# Patient Record
Sex: FEMALE | Race: WHITE
[De-identification: names, ages, dates, MRNs, and addresses within clinical notes are randomized per-mention and may not be internally consistent; named-entity substitution may affect disease eponyms.]

---

## 2019-06-12 ENCOUNTER — HOSPITAL ENCOUNTER (INPATIENT)
Dept: HOSPITAL 50 - VM.ED | Age: 74
LOS: 2 days | Discharge: NURSING, REHAB OR LONG-TERM CARE | DRG: 378 | End: 2019-06-14
Attending: FAMILY MEDICINE | Admitting: FAMILY MEDICINE
Payer: MEDICARE

## 2019-06-12 DIAGNOSIS — Z91.018: ICD-10-CM

## 2019-06-12 DIAGNOSIS — K20.9: ICD-10-CM

## 2019-06-12 DIAGNOSIS — M81.0: ICD-10-CM

## 2019-06-12 DIAGNOSIS — R00.0: ICD-10-CM

## 2019-06-12 DIAGNOSIS — Z66: ICD-10-CM

## 2019-06-12 DIAGNOSIS — K22.8: ICD-10-CM

## 2019-06-12 DIAGNOSIS — R05: ICD-10-CM

## 2019-06-12 DIAGNOSIS — G40.909: ICD-10-CM

## 2019-06-12 DIAGNOSIS — F73: ICD-10-CM

## 2019-06-12 DIAGNOSIS — F03.90: ICD-10-CM

## 2019-06-12 DIAGNOSIS — R11.10: ICD-10-CM

## 2019-06-12 DIAGNOSIS — K56.7: ICD-10-CM

## 2019-06-12 DIAGNOSIS — Z79.899: ICD-10-CM

## 2019-06-12 DIAGNOSIS — K44.9: ICD-10-CM

## 2019-06-12 DIAGNOSIS — K59.09: ICD-10-CM

## 2019-06-12 DIAGNOSIS — E87.0: ICD-10-CM

## 2019-06-12 DIAGNOSIS — J45.909: ICD-10-CM

## 2019-06-12 DIAGNOSIS — G80.9: ICD-10-CM

## 2019-06-12 DIAGNOSIS — Z91.041: ICD-10-CM

## 2019-06-12 DIAGNOSIS — K31.84: ICD-10-CM

## 2019-06-12 DIAGNOSIS — K92.0: Primary | ICD-10-CM

## 2019-06-12 PROCEDURE — C9113 INJ PANTOPRAZOLE SODIUM, VIA: HCPCS

## 2019-06-13 LAB
ANION GAP SERPL CALC-SCNC: 16.3 MMOL/L (ref 10–20)
CHLORIDE SERPL-SCNC: 105 MMOL/L (ref 98–107)
SODIUM SERPL-SCNC: 144 MMOL/L (ref 136–145)

## 2019-06-13 RX ADMIN — SODIUM CHLORIDE AND POTASSIUM CHLORIDE SCH MLS/HR: .9; .15 SOLUTION INTRAVENOUS at 07:13

## 2019-06-13 RX ADMIN — BUDESONIDE SCH MG: 0.5 SUSPENSION RESPIRATORY (INHALATION) at 20:08

## 2019-06-13 RX ADMIN — ZINC SCH: TAB ORAL at 12:40

## 2019-06-13 RX ADMIN — MICONAZOLE NITRATE SCH APPLICFUL: 20 CREAM VAGINAL at 11:39

## 2019-06-13 RX ADMIN — SODIUM CHLORIDE AND POTASSIUM CHLORIDE SCH MLS/HR: .9; .15 SOLUTION INTRAVENOUS at 15:02

## 2019-06-13 RX ADMIN — BUDESONIDE SCH MG: 0.5 SUSPENSION RESPIRATORY (INHALATION) at 10:57

## 2019-06-13 RX ADMIN — MICONAZOLE NITRATE SCH APPLICFUL: 20 CREAM VAGINAL at 20:09

## 2019-06-13 NOTE — CR
______________________________________________________________________________   

  

[id number] RAD/RAD Chest PA or AP 1V  

EXAM:  RAD Chest PA or AP 1V  

   

 INDICATION:  WHEEZING, ASPIRATION?  

   

 COMPARISON:  2014.  

   

 DISCUSSION:    

   

 Asymmetric elevated left hemidiaphragm resulting in left lung base vascular  

 crowding and atelectasis.  

   

 Within limitations of severe scoliosis and elevated left hemidiaphragm, no  

 evidence of aspiration.  

   

 There is possible cardiomegaly with mild central vascular congestion.  

   

 Colon demonstrates gaseous distention.  

   

 IMPRESSION:  

 As above.  

   

 Electronically signed by Jose Bassett MD on 6/13/2019 7:53 AM  

   

  

Jose Bassett MD                 

 06/13/19 0755    

  

Thank you for allowing us to participate in the care of your patient.

## 2019-06-13 NOTE — PCM.HP
H&P History of Present Illness





- General


Date of Service: 06/13/19


Admit Problem/Dx: 


 Admission Diagnosis/Problem





Admission Diagnosis/Problem      Ileus








Source of Information: EMS, Nursing Home Records, Provider


History Limitations: Reports: Physical Impairment





- History of Present Illness


Initial Comments - Free Text/Narative: 








Chief complaint: Vomiting





History of present illness: Patient was sent from nursing home to ER for 

possible hematemesis.  She vomits and coughs a lot at baseline.  She has 

profound intellectual disability plus dementia and cerebral palsy.  She been 

throwing up a bit more in the noticed a little bit of blood and this appeared 

bit brown.  No fever or diarrhea or change otherwise in her baseline.  On 

arrival in the ER they did Hemoccult of gastric and rectal contents which was 

negative for blood.  She was not anemic.  Vomiting seem to slow in the ER with 

some treatment there.  She had CT abdomen and chest pelvis which were pretty 

unrevealing for any real acute source.  She had an ileus which appears been 

present on past films.  She has gastroparesis which also looks like it's 

chronic is she is on Reglan four times a day at baseline.  There is diffuse 

thickening of the distal esophagus NOS.





Past medical history: Profound intellectual disability, seizure disorder, 

dementia, osteoporosis, hiatal hernia, past fractures, endometrial carcinoma, 

decubitus ulcer, constipation, cerebral palsy, asthma, 





Medication list unresolved in EPIC: Pulmicort, Atrovent, Proventil, Reglan, 

phenobarbital, Atrovent, Zantac, Maria Fernanda lax, Singulair, and teaser, senna, Asmanex

, Flovent, takes citrate, DuoNeb, Prilosec, Duca lax, milk of magnesia.  

Allergies: Red dye





Social history: Nursing home resident, nonsmoker.





Family history: Noncontributory





Review of systems: Unable to obtain beyond above.





Physical exam: Vital signs are normal, she is laying in bed fetal position on 

her right side.  She is nonverbal.  Eyes are open spontaneously, no acute 

distress, oral mucosa is pink and a bit dry.  There is a bit of brown material 

increases of her tongue.  I don't see any gross oral lacerations.  Heart and 

lungs are clear to auscultation, abdomen mild distended, appears nontender and 

a nonverbal patient.  Chimneys warm well perfused, trace edema.





Assessment and plan:


Report of possible hematemesis.  Hemoglobin is stable, Hemoccult was negative, 

appears to be from some local trauma possibly in the mouth.  Recheck CBC in a.m.

, monitor for any recurrence.  Has a seizure history but no recent seizure 

activity noted as cause of new oral trauma.  Continue acid suppressing medicine 

for some esophagitis NOS seen by CT.





Regarding vomiting it appears improved.  Resume home Reglan.  Appears to have 

vomiting, gastroparesis and maybe chronic ileus at baseline.  Advance diet to 

clear liquids, continue maintenance fluids for now, advance to her baseline 

pure diet as tolerated.  Recheck chemistry in a.m.











- Related Data


Allergies/Adverse Reactions: 


 Allergies











Allergy/AdvReac Type Severity Reaction Status Date / Time


 


red dye Allergy Unknown Other Verified 06/13/19 09:24











Home Medications: 


 Home Meds





Bisacodyl 10 mg RC DAILY PRN 04/18/14 [History]


Metoclopramide [Reglan] 10 mg PO QID 04/18/14 [History]


Montelukast [Singulair] 10 mg PO DAILY 04/18/14 [History]


Multivitamin with Minerals [Multivitamins with Minerals] 1 each PO DAILY 04/18/ 14 [History]


PHENobarbital 97.2 mg PO DAILY 04/18/14 [History]


Sennosides [Senokot] 17.2 mg PO BID 04/18/14 [History]


Acetaminophen [Tylenol] 650 mg PO Q4H PRN 06/13/19 [History]


Albuterol/Ipratropium [DuoNeb 3.0-0.5 MG/3 ML] 3 ml INH Q4HR PRN 06/13/19 [

History]


Albuterol/Ipratropium [DuoNeb 3.0-0.5 MG/3 ML] 3 ml INH QID 06/13/19 [History]


Bisacodyl 5 mg PO DAILY PRN 06/13/19 [History]


Bisacodyl [Laxative Suppository] 10 mg RC Q48H PRN 06/13/19 [History]


Budesonide [Pulmicort] 0.5 mg NEB BID 06/13/19 [History]


Dextran 70/Hypromellose [Artificial Tears] 1 - 2 drop EYELF ASDIRECTED PRN 06/13 /19 [History]


Magnesium Hydroxide [Milk of Magnesia] 15 ml PO Q3D PRN 06/13/19 [History]


Miconazole [Miconazole 2% Crm] 1 applic TOP BID 06/13/19 [History]


Polyethylene Glycol 3350 [MiraLAX] 17 gm PO DAILY 06/13/19 [History]


Ranitidine [Zantac] 150 mg PO BID 06/13/19 [History]











Past Medical History


Respiratory History: Reports: Asthma


Gastrointestinal History: Reports: Chronic Constipation, Other (See Below)


Other Gastrointestinal History: dysphagia, dyspepsia


Musculoskeletal History: Reports: Osteoporosis


Neurological History: Reports: Cerebral Palsy, Seizure, Other (See Below)


Other Neuro History: dementia, profound intellectual disabilities, convulsions





Social & Family History





- Family History


Family Medical History: Noncontributory





- Tobacco Use


Smoking Status *Q: Current Status Unknown


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


Second Hand Smoke Exposure: No





- Caffeine Use


Caffeine Use: Reports: None





- Recreational Drug Use


Recreational Drug Use: No





H&P Review of Systems





- Review of Systems:


Review Of Systems: See Below





Exam





- Exam


Exam: See Below





- Vital Signs


Vital Signs: 


 Last Vital Signs











Temp  37.1 C   06/13/19 05:59


 


Pulse  102 H  06/13/19 05:59


 


Resp  20   06/13/19 05:59


 


BP  139/59 L  06/13/19 05:59


 


Pulse Ox  91 L  06/13/19 07:21











Weight: 49.243 kg





- Patient Data


Lab Results Last 24 hrs: 


 Laboratory Results - last 24 hr











  06/13/19 06/13/19 06/13/19 Range/Units





  00:04 00:04 00:04 


 


WBC  11.5 H    (4.0-10.0)  x10^3/uL


 


RBC  4.81    (4.00-5.50)  x10^6/uL


 


Hgb  14.6  D    (12.0-16.0)  g/dL


 


Hct  44.6    (33.0-47.0)  %


 


MCV  92.7  D    (78.0-93.0)  fL


 


MCH  30.4    (26.0-32.0)  pg


 


MCHC  32.7    (32.0-36.0)  g/dL


 


RDW Coeff of Precious  13.9    (10.0-15.0)  %


 


Plt Count  280    (130-400)  x10^3/uL


 


Neut % (Auto)  91.7 H    (50.0-80.0)  %


 


Lymph % (Auto)  5.1 L    (25.0-50.0)  %


 


Mono % (Auto)  2.7    (2.0-11.0)  %


 


Eos % (Auto)  0.3    (0.0-4.0)  %


 


Baso % (Auto)  0.2    (0.2-1.2)  %


 


Add Manual Diff     


 


PT   10.8   (10.0-12.8)  SEC


 


INR   1.0 L   (2.0-3.5)  


 


Sodium    144  (136-145)  mmol/L


 


Potassium    4.3  (3.5-5.1)  mmol/L


 


Chloride    105  ()  mmol/L


 


Carbon Dioxide    27  (21-32)  mmol/L


 


Anion Gap    16.3  (10-20)  mmol/L


 


BUN    21 H  (7-18)  mg/dL


 


Creatinine    0.6  (0.55-1.02)  mg/dL


 


Est Cr Clr Drug Dosing    TNP  


 


Estimated GFR (MDRD)    > 60  


 


Glucose    145 H  ()  mg/dL


 


Lactic Acid     (0.4-2.0)  mmol/L


 


Calcium    9.6  (8.5-10.1)  mg/dL


 


Corrected Calcium    10.24 H  (8.5-10.1)  mg/dL


 


Phosphorus    3.1  (2.6-4.7)  mg/dL


 


Magnesium    2.1  (1.8-2.4)  mg/dL


 


Total Bilirubin    0.3  (0.2-1.0)  mg/dL


 


AST    32  (15-37)  U/L


 


ALT    78 H  (14-59)  U/L


 


Alkaline Phosphatase    173 H  ()  U/L


 


C-Reactive Protein    1.3 H  (<=0.9)  mg/dL


 


Total Protein    8.1  (6.4-8.2)  g/dL


 


Albumin    3.2 L  (3.4-5.0)  g/dL


 


Globulin    4.9  


 


Albumin/Globulin Ratio    0.65  














  06/13/19 06/13/19 06/13/19 Range/Units





  00:04 06:21 06:21 


 


WBC   10.8 H   (4.0-10.0)  x10^3/uL


 


RBC   4.40   (4.00-5.50)  x10^6/uL


 


Hgb   13.3   (12.0-16.0)  g/dL


 


Hct   41.2   (33.0-47.0)  %


 


MCV   93.6 H   (78.0-93.0)  fL


 


MCH   30.2   (26.0-32.0)  pg


 


MCHC   32.3   (32.0-36.0)  g/dL


 


RDW Coeff of Precious   13.9   (10.0-15.0)  %


 


Plt Count   228   (130-400)  x10^3/uL


 


Neut % (Auto)   89.0 H   (50.0-80.0)  %


 


Lymph % (Auto)   6.0 L   (25.0-50.0)  %


 


Mono % (Auto)   5.0   (2.0-11.0)  %


 


Eos % (Auto)   0.0   (0.0-4.0)  %


 


Baso % (Auto)   0.0 L   (0.2-1.2)  %


 


Add Manual Diff   Yes   


 


PT     (10.0-12.8)  SEC


 


INR     (2.0-3.5)  


 


Sodium     (136-145)  mmol/L


 


Potassium     (3.5-5.1)  mmol/L


 


Chloride     ()  mmol/L


 


Carbon Dioxide     (21-32)  mmol/L


 


Anion Gap     (10-20)  mmol/L


 


BUN     (7-18)  mg/dL


 


Creatinine     (0.55-1.02)  mg/dL


 


Est Cr Clr Drug Dosing     


 


Estimated GFR (MDRD)     


 


Glucose     ()  mg/dL


 


Lactic Acid  1.0   0.7  (0.4-2.0)  mmol/L


 


Calcium     (8.5-10.1)  mg/dL


 


Corrected Calcium     (8.5-10.1)  mg/dL


 


Phosphorus     (2.6-4.7)  mg/dL


 


Magnesium     (1.8-2.4)  mg/dL


 


Total Bilirubin     (0.2-1.0)  mg/dL


 


AST     (15-37)  U/L


 


ALT     (14-59)  U/L


 


Alkaline Phosphatase     ()  U/L


 


C-Reactive Protein     (<=0.9)  mg/dL


 


Total Protein     (6.4-8.2)  g/dL


 


Albumin     (3.4-5.0)  g/dL


 


Globulin     


 


Albumin/Globulin Ratio     











Result Diagrams: 


 06/13/19 06:21





 06/13/19 00:04


Sancho Results Last 24 hrs: 


 Microbiology











 06/13/19 00:11 Anaerobic Blood Culture - Final





 Blood - Venous - Lab Draw 











Problem List Initiated/Reviewed/Updated: Yes


Orders Last 24hrs: 


 Active Orders 24 hr











 Category Date Time Status


 


 Patient Status [ADT] Routine ADT  06/13/19 02:19 Active


 


 Bedrest [RC] 08,20 Care  06/13/19 02:46 Active


 


 Intake and Output [RC] QSHIFT Care  06/13/19 02:46 Active


 


 Vital Signs [RC] 06,10,14,18,22,02 Care  06/13/19 02:46 Active


 


 Clear Liquid Diet [DIET] Diet  06/13/19 Lunch Ordered


 


 Nothing Per Oral Diet [DIET] Diet  06/13/19 Breakfast Active


 


 BASIC METABOLIC PANEL,BMP [CHEM] AM Lab  06/14/19 05:11 Ordered


 


 CBC WITH AUTO DIFF [HEME] AM Lab  06/14/19 05:11 Ordered


 


 CULTURE BLOOD [BC] Stat Lab  06/12/19 23:42 Received


 


 CULTURE BLOOD [BC] Stat Lab  06/12/19 23:42 Results


 


 CULTURE MRSA SURVEY [RM] Routine Lab  06/13/19 04:46 Received


 


 Acetaminophen [Tylenol] Med  06/13/19 10:10 Ordered





 650 mg PO Q4H PRN   


 


 Acetaminophen [Tylenol] Med  06/13/19 02:48 Active





 650 mg PO Q4HR PRN   


 


 Albuterol/Ipratropium [DuoNeb 3.0-0.5 MG/3 ML] Med  06/13/19 02:48 Active





 3 ml INH Q4H PRN   


 


 Albuterol/Ipratropium [DuoNeb 3.0-0.5 MG/3 ML] Med  06/13/19 07:00 Active





 3 ml INH QIDRT   


 


 Bisacodyl [Dulcolax] Med  06/13/19 10:10 Ordered





 10 mg .XX DAILY PRN   


 


 Bisacodyl [Dulcolax] Med  06/13/19 10:10 Ordered





 10 mg .XX Q48H PRN   


 


 Bisacodyl [Dulcolax] Med  06/13/19 10:10 Ordered





 5 mg PO DAILY PRN   


 


 Budesonide [Pulmicort] Med  06/13/19 11:00 Active





 0.5 mg INH BIDRT   


 


 Budesonide [Pulmicort] Med  06/13/19 20:00 Ordered





 0.5 mg NEB BID   


 


 Dextran 70/Hypromellose [Artificial Tears] Med  06/13/19 10:10 Ordered





 1 - 2 drop EYELF ASDIRECTED PRN   


 


 Magnesium Hydroxide [Milk of Magnesia] Med  06/13/19 10:10 Ordered





 15 ml PO Q3D PRN   


 


 Metoclopramide [Reglan] Med  06/13/19 12:00 Ordered





 10 mg PO QID   


 


 Metoclopramide [Reglan] Med  06/13/19 03:00 Active





 5 mg IVPUSH Q6H   


 


 Miconazole [Miconazole 2% Crm] Med  06/13/19 20:00 Ordered





 1 applic TOP BID   


 


 Miconazole [Miconazole 2% Vaginal] Med  06/13/19 09:45 Active





 0 gm TOP BID   


 


 Montelukast [Singulair] Med  06/14/19 08:00 Ordered





 10 mg PO DAILY   


 


 Multivitamin with Minerals [Multivitamins with Minerals Med  06/14/19 08:00 

Ordered





 ]   





 1 each PO DAILY   


 


 NS + KCl 20mEq/L [Normal Saline with 20 mEq KCl] 1,000 Med  06/13/19 03:00 

Active





 ml   





 IV ASDIRECTED   


 


 PHENobarbital Med  06/13/19 08:00 Active





 97.2 mg PO DAILY   


 


 Pantoprazole [ProTONIX IV***] Med  06/13/19 03:00 Active





 40 mg IVPUSH Q12H   


 


 Polyethylene Glycol 3350 [MiraLAX] Med  06/14/19 08:00 Ordered





 17 gm PO DAILY   


 


 Ranitidine [Zantac] Med  06/13/19 20:00 Ordered





 150 mg PO BID   


 


 Sennosides [Senna] Med  06/13/19 20:00 Ordered





 17.2 mg PO BID   


 


 Sodium Chloride 0.9% [Saline Flush] Med  06/12/19 23:34 Active





 10 ml FLUSH ASDIRECTED PRN   


 


 Blood Culture x2 Reflex Set [OM.PC] Stat Oth  06/12/19 23:41 Ordered


 


 Peripheral IV Insertion Adult [OM.PC] Routine Oth  06/12/19 23:35 Ordered








 Medication Orders





Acetaminophen (Tylenol)  650 mg PO Q4HR PRN


   PRN Reason: Pain


Acetaminophen (Tylenol)  650 mg PO Q4H PRN


   PRN Reason: Pain (mild 1-3)


Albuterol/Ipratropium (Duoneb 3.0-0.5 Mg/3 Ml)  3 ml INH Q4H PRN


   PRN Reason: Wheezing


Albuterol/Ipratropium (Duoneb 3.0-0.5 Mg/3 Ml)  3 ml INH QIDRT [facility]


   Last Admin: 06/13/19 07:13  Dose: 3 ml


Bisacodyl (Dulcolax)  10 mg .XX Q48H PRN


   PRN Reason: Constipation


Bisacodyl (Dulcolax)  5 mg PO DAILY PRN


   PRN Reason: Constipation


Bisacodyl (Dulcolax)  10 mg .XX DAILY PRN


   PRN Reason: Constipation


Budesonide (Pulmicort)  0.5 mg INH BIDRT [facility]ATI


Budesonide (Pulmicort)  0.5 mg NEB BID [facility]ATI


Potassium Chloride/Sodium Chloride (Normal Saline With 20 Meq Kcl)  1,000 mls @ 

100 mls/hr IV ASDIRECTED [facility]


   Last Admin: 06/13/19 07:13  Dose: 100 mls/hr


Magnesium Hydroxide (Milk Of Magnesia)  15 ml PO Q3D PRN


   PRN Reason: Constipation


Metoclopramide HCl (Reglan)  5 mg IVPUSH Q6H [facility]


   Last Admin: 06/13/19 09:05  Dose: 5 mg


   Admin: 06/13/19 03:11  Dose: 5 mg


Metoclopramide HCl (Reglan)  10 mg PO QID [facility]


Miconazole (Miconazole 2% Vaginal)  0 gm TOP BID [facility]


Montelukast Sodium (Singulair)  10 mg PO DAILY [facility]


Non-Formulary Medication (Dextran 70/Hypromellose [Artificial Tears])  1 - 2 

drop EYELF ASDIRECTED PRN


   PRN Reason: Dry Eyes


Non-Formulary Medication (Miconazole [Miconazole 2% Crm])  1 applic TOP BID [facility]


Non-Formulary Medication (Multivitamin With Minerals [Multivitamins With 

Minerals])  1 each PO DAILY [facility]


Non-Formulary Medication (Ranitidine [Zantac])  150 mg PO BID [facility]


Pantoprazole Sodium (Protonix Iv***)  40 mg IVPUSH Q12H [facility]


   Last Admin: 06/13/19 03:15  Dose: 40 mg


Phenobarbital (Phenobarbital)  97.2 mg PO DAILY [facility]


   Last Admin: 06/13/19 09:11 Dose:  Not Given


Polyethylene Glycol (Miralax)  17 gm PO DAILY [facility]


Senna (Senna)  17.2 mg PO BID [facility]


Sodium Chloride (Saline Flush)  10 ml FLUSH ASDIRECTED PRN


   PRN Reason: Keep Vein Open


   Last Admin: 06/13/19 03:24  Dose: 10 ml

## 2019-06-13 NOTE — CT
______________________________________________________________________________   

  

[id number] CT/CT Chest Abdomen Pelvis W IV  

EXAM: CT Chest Abdomen Pelvis W IV  

   

 CLINICAL DATA: HEMATEMESIS, DIMINISHED BOWEL SOUNDS  

   

 COMPARISON STUDY: April 2014.  

   

 FINDINGS:   

   

 Chest, abdomen, and pelvis:  

   

 12 x 9 x 5 mm solid noncalcified left lower lobe pulmonary nodule (series 2  

 image 33). Finding is nonspecific in etiology. Bibasal subsegmental atelectasis  

 and/or scarring.  

   

 No mediastinal or hilar lymphadenopathy.  

   

 Moderate circumferential thickening throughout the distal aspect of the  

 esophagus extending to the gastroesophageal junction. Stomach is dilated and  

 distended with air-fluid level.  

   

 Colon demonstrates marked gaseous distention without evidence of obstructing  

 mass. No evidence of a small bowel obstruction. No pneumatosis or  

 pneumoperitoneum.  

   

 Cholelithiasis without evidence of acute cholecystitis. Liver, spleen, pancreas,  

 and adrenal glands are unremarkable.  

   

 4 mm calculus in the proximal left ureter just distal to the UPJ. No evidence of  

 obstruction. Kidneys are otherwise unremarkable.  

   

 Trace free fluid in the dependent recess of the pelvis.  

   

 Bones and soft tissues:  

   

 Advanced dextroscoliosis. 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  

 dislocated left femoroacetabular articulation.  

   

 No acute fracture, compression deformity, or osseous lesion.  

   

 IMPRESSION:  

   

 Moderate circumferential wall thickening in the distal esophagus, nonspecific  

 but suggesting esophagitis.  

   

 Gaseous distention and dilation of the stomach with an air-fluid level.  

   

 Gaseous distention of the colon without obstructing mass identified.  

   

 Trace free fluid in the dependent recess of the pelvis, nonspecific in etiology.  

   

 Other findings are described above.  

   

 Electronically signed by Jose Bassett MD on 6/13/2019 8:30 AM  

   

  

Jose Bassett MD                 

 06/13/19 0833    

  

Thank you for allowing us to participate in the care of your patient.

## 2019-06-13 NOTE — EDM.PDOC
ED HPI GENERAL MEDICAL PROBLEM





- General


Chief Complaint: Gastrointestinal Problem


Stated Complaint: vomiting


Time Seen by Provider: 06/12/19 23:34


Source of Information: Reports: Patient, EMS, EMS Notes Reviewed, Nursing Home 

Records, Old Records, RN Notes Reviewed


History Limitations: Reports: Language Barrier, Physical Impairment





- History of Present Illness


INITIAL COMMENTS - FREE TEXT/NARRATIVE: 





Pt. presents to ER via EMS. She is a resident at the [facility] with severe CP and 

presents with cough and vomiting. Staff states that it is not uncommon for the 

patient to vomit once or twice per day, but they noted what they thought to be 

yoli blood in her vomit, and also state that she has had coffee ground emesis 

as well. Staff states that this all started at 1930 this evening. She has 

increased cough and noisy breathing, and feel she may have aspirated. She is 

unable to communicate and subsequently unable to offer any complaints. Staff 

states that she is otherwise behaving normally. 


Staff at [facility] relates that the patient had a large BM this AM, and apparently it 

was normal.


Onset: Today


Location: Reports: Abdomen





- Related Data


 Allergies











Allergy/AdvReac Type Severity Reaction Status Date / Time


 


red (food color) Allergy  Cannot Verified 06/12/19 23:37





   Remember  











Home Meds: 


 Home Meds





Bisacodyl 10 mg RC ASDIRECTED PRN 04/18/14 [History]


Budesonide [Pulmicort] 1 ampule INH BID 04/18/14 [History]


Metoclopramide [Reglan] 10 mg PO QID 04/18/14 [History]


Montelukast [Singulair] 10 mg PO DAILY 04/18/14 [History]


Multivitamin with Minerals [Multivitamins with Minerals] 1 each PO DAILY 04/18/ 14 [History]


PHENobarbital 97.2 mg PO DAILY 04/18/14 [History]


Sennosides [Senokot] 8.6 mg PO BID 04/18/14 [History]


Acetaminophen [Acetaminophen 8 Hour] 650 mg PO Q4HR PRN 06/13/19 [History]


Albuterol/Ipratropium [DuoNeb 3.0-0.5 MG/3 ML] 3 ml INH Q4HR PRN 06/13/19 [

History]


Albuterol/Ipratropium [DuoNeb 3.0-0.5 MG/3 ML] 3 ml INH QID 06/13/19 [History]


Bisacodyl 5 mg PO ASDIRECTED PRN 06/13/19 [History]


Dextran 70/Hypromellose [Artificial Tears] 1 each OP ASDIRECTED PRN 06/13/19 [

History]


Dimethicone [Cavilon Durable Barrier] 92 gm TP BID 06/13/19 [History]


Magnesium Hydroxide [Milk of Magnesia] 15 ml PO ASDIRECTED PRN 06/13/19 [History

]


Polyethylene Glycol 3350 [MiraLAX] 17 gm PO DAILY 06/13/19 [History]


Ranitidine [Zantac] 150 mg PO BID 06/13/19 [History]











ED ROS GENERAL





- Review of Systems


Review Of Systems: Unable To Obtain





ED EXAM, GENERAL





- Physical Exam


Exam: See Below


Exam Limited By: No Limitations


General Appearance: Alert, WD/WN, No Apparent Distress


Head: Atraumatic, Normocephalic


Neck: Normal Inspection, Supple, Full Range of Motion


Respiratory/Chest: No Respiratory Distress, No Accessory Muscle Use, Decreased 

Breath Sounds, Wheezing


Cardiovascular: Normal Peripheral Pulses, Regular Rate, Rhythm, No Edema, No JVD

, No Murmur


Peripheral Pulses: 4+: Radial (R)


GI/Abdominal: Soft, No Organomegaly, Distended, Rigid, Other (diminished BS, 

protuberant abdomen.)


Rectal (Female) Exam: Normal Exam, Normal Rectal Tone, Heme + Stool


Back Exam: Normal Inspection, Full Range of Motion


Extremities: Normal Inspection, Normal Range of Motion, Non-Tender, No Pedal 

Edema, Normal Capillary Refill


Neurological: Other (Unable to communicate. Withdraws from pain. Profound 

intellectual disabilities.)


Skin Exam: Warm, Dry, Intact, Normal Color, No Rash


Lymphatic: No Adenopathy





Course





- Vital Signs


Last Recorded V/S: 


 Last Vital Signs











Temp  36.5 C   06/12/19 23:37


 


Pulse  102 H  06/12/19 23:37


 


Resp  24 H  06/12/19 23:37


 


BP  154/79 H  06/12/19 23:37


 


Pulse Ox  94 L  06/12/19 23:37














- Orders/Labs/Meds


Orders: 


 Active Orders 24 hr











 Category Date Time Status


 


 Chest 1V Frontal [CR] Stat Exams  06/12/19 23:37 Taken


 


 Chest Abdomen Pelvis w Cont [CT] Stat Exams  06/12/19 23:38 Ordered


 


 CULTURE BLOOD [BC] Stat Lab  06/12/19 23:42 Received


 


 CULTURE BLOOD [BC] Stat Lab  06/12/19 23:42 Results


 


 GASTRO OCCULT BLOOD,POC [POC] Stat Lab  06/13/19 00:33 Ordered


 


 Sodium Chloride 0.9% [Normal Saline] 1,000 ml Med  06/12/19 23:40 Active





 IV ONETIME   


 


 Sodium Chloride 0.9% [Saline Flush] Med  06/12/19 23:34 Active





 10 ml FLUSH ASDIRECTED PRN   


 


 Blood Culture x2 Reflex Set [OM.PC] Stat Oth  06/12/19 23:41 Ordered


 


 Peripheral IV Insertion Adult [OM.PC] Routine Oth  06/12/19 23:35 Ordered








 Medication Orders





Sodium Chloride (Normal Saline)  1,000 mls @ 125 mls/hr IV ONETIME ONE


   Stop: 06/13/19 07:39


   Last Admin: 06/13/19 00:05  Dose: 125 mls/hr


Sodium Chloride (Saline Flush)  10 ml FLUSH ASDIRECTED PRN


   PRN Reason: Keep Vein Open








Labs: 


 Laboratory Tests











  06/13/19 06/13/19 06/13/19 Range/Units





  00:04 00:04 00:04 


 


WBC  11.5 H    (4.0-10.0)  x10^3/uL


 


RBC  4.81    (4.00-5.50)  x10^6/uL


 


Hgb  14.6  D    (12.0-16.0)  g/dL


 


Hct  44.6    (33.0-47.0)  %


 


MCV  92.7  D    (78.0-93.0)  fL


 


MCH  30.4    (26.0-32.0)  pg


 


MCHC  32.7    (32.0-36.0)  g/dL


 


RDW Coeff of Precious  13.9    (10.0-15.0)  %


 


Plt Count  280    (130-400)  x10^3/uL


 


Neut % (Auto)  91.7 H    (50.0-80.0)  %


 


Lymph % (Auto)  5.1 L    (25.0-50.0)  %


 


Mono % (Auto)  2.7    (2.0-11.0)  %


 


Eos % (Auto)  0.3    (0.0-4.0)  %


 


Baso % (Auto)  0.2    (0.2-1.2)  %


 


PT   10.8   (10.0-12.8)  SEC


 


INR   1.0 L   (2.0-3.5)  


 


Sodium    144  (136-145)  mmol/L


 


Potassium    4.3  (3.5-5.1)  mmol/L


 


Chloride    105  ()  mmol/L


 


Carbon Dioxide    27  (21-32)  mmol/L


 


Anion Gap    16.3  (10-20)  mmol/L


 


BUN    21 H  (7-18)  mg/dL


 


Creatinine    0.6  (0.55-1.02)  mg/dL


 


Est Cr Clr Drug Dosing    TNP  


 


Estimated GFR (MDRD)    > 60  


 


Glucose    145 H  ()  mg/dL


 


Lactic Acid     (0.4-2.0)  mmol/L


 


Calcium    9.6  (8.5-10.1)  mg/dL


 


Corrected Calcium    10.24 H  (8.5-10.1)  mg/dL


 


Phosphorus    3.1  (2.6-4.7)  mg/dL


 


Magnesium    2.1  (1.8-2.4)  mg/dL


 


Total Bilirubin    0.3  (0.2-1.0)  mg/dL


 


AST    32  (15-37)  U/L


 


ALT    78 H  (14-59)  U/L


 


Alkaline Phosphatase    173 H  ()  U/L


 


C-Reactive Protein    1.3 H  (<=0.9)  mg/dL


 


Total Protein    8.1  (6.4-8.2)  g/dL


 


Albumin    3.2 L  (3.4-5.0)  g/dL


 


Globulin    4.9  


 


Albumin/Globulin Ratio    0.65  














  06/13/19 Range/Units





  00:04 


 


WBC   (4.0-10.0)  x10^3/uL


 


RBC   (4.00-5.50)  x10^6/uL


 


Hgb   (12.0-16.0)  g/dL


 


Hct   (33.0-47.0)  %


 


MCV   (78.0-93.0)  fL


 


MCH   (26.0-32.0)  pg


 


MCHC   (32.0-36.0)  g/dL


 


RDW Coeff of Precious   (10.0-15.0)  %


 


Plt Count   (130-400)  x10^3/uL


 


Neut % (Auto)   (50.0-80.0)  %


 


Lymph % (Auto)   (25.0-50.0)  %


 


Mono % (Auto)   (2.0-11.0)  %


 


Eos % (Auto)   (0.0-4.0)  %


 


Baso % (Auto)   (0.2-1.2)  %


 


PT   (10.0-12.8)  SEC


 


INR   (2.0-3.5)  


 


Sodium   (136-145)  mmol/L


 


Potassium   (3.5-5.1)  mmol/L


 


Chloride   ()  mmol/L


 


Carbon Dioxide   (21-32)  mmol/L


 


Anion Gap   (10-20)  mmol/L


 


BUN   (7-18)  mg/dL


 


Creatinine   (0.55-1.02)  mg/dL


 


Est Cr Clr Drug Dosing   


 


Estimated GFR (MDRD)   


 


Glucose   ()  mg/dL


 


Lactic Acid  1.0  (0.4-2.0)  mmol/L


 


Calcium   (8.5-10.1)  mg/dL


 


Corrected Calcium   (8.5-10.1)  mg/dL


 


Phosphorus   (2.6-4.7)  mg/dL


 


Magnesium   (1.8-2.4)  mg/dL


 


Total Bilirubin   (0.2-1.0)  mg/dL


 


AST   (15-37)  U/L


 


ALT   (14-59)  U/L


 


Alkaline Phosphatase   ()  U/L


 


C-Reactive Protein   (<=0.9)  mg/dL


 


Total Protein   (6.4-8.2)  g/dL


 


Albumin   (3.4-5.0)  g/dL


 


Globulin   


 


Albumin/Globulin Ratio   











Meds: 


Medications











Generic Name Dose Route Start Last Admin





  Trade Name Freq  PRN Reason Stop Dose Admin


 


Sodium Chloride  1,000 mls @ 125 mls/hr  06/12/19 23:40  06/13/19 00:05





  Normal Saline  IV  06/13/19 07:39  125 mls/hr





  ONETIME ONE   Administration





     





     





     





     


 


Sodium Chloride  10 ml  06/12/19 23:34  





  Saline Flush  FLUSH   





  ASDIRECTED PRN   





  Keep Vein Open   





     





     





     














Discontinued Medications














Generic Name Dose Route Start Last Admin





  Trade Name Freq  PRN Reason Stop Dose Admin


 


Iopamidol  100 ml  06/13/19 00:55  06/13/19 01:13





  Isovue-300 (61%)  IVPUSH  06/13/19 00:56  100 ml





  ONETIME ONE   Administration





     





     





     





     


 


Ondansetron HCl  4 mg  06/12/19 23:40  06/13/19 00:29





  Zofran  IVPUSH  06/12/19 23:41  4 mg





  ONETIME ONE   Administration





     





     





     





     














- Radiology Interpretation


Free Text/Narrative:: 





Elevated L hemidiaphragm with gastric distention of colon. No obvious 

infiltrate. Atelectasis of L lower lobe.





CT chest, abdomen and pelvis obtained. There was evidence of gastroparesis, 

colonic ileus, sliding hiatal hernia, diffuse thickening of the distal 

esophagus.





Departure





- Departure


Time of Disposition: 02:20


Disposition: DC/Tfer to Acute Hospital [facility]


Clinical Impression: 


 Ileus








- Discharge Information


Forms:  ED Department Discharge





- Problem List Review


Problem List Initiated/Reviewed/Updated: Yes





- My Orders


Last 24 Hours: 


My Active Orders





06/12/19 23:34


Sodium Chloride 0.9% [Saline Flush]   10 ml FLUSH ASDIRECTED PRN 





06/12/19 23:35


Peripheral IV Insertion Adult [OM.PC] Routine 





06/12/19 23:37


Chest 1V Frontal [CR] Stat 





06/12/19 23:38


Chest Abdomen Pelvis w Cont [CT] Stat 





06/12/19 23:40


Sodium Chloride 0.9% [Normal Saline] 1,000 ml IV ONETIME 





06/12/19 23:41


Blood Culture x2 Reflex Set [OM.PC] Stat 





06/12/19 23:42


CULTURE BLOOD [BC] Stat 


CULTURE BLOOD [BC] Stat 





06/13/19 00:33


GASTRO OCCULT BLOOD,POC [POC] Stat 














- Assessment/Plan


Admission H&P: Please use this note as an admission H&P


Last 24 Hours: 


My Active Orders





06/12/19 23:34


Sodium Chloride 0.9% [Saline Flush]   10 ml FLUSH ASDIRECTED PRN 





06/12/19 23:35


Peripheral IV Insertion Adult [OM.PC] Routine 





06/12/19 23:37


Chest 1V Frontal [CR] Stat 





06/12/19 23:38


Chest Abdomen Pelvis w Cont [CT] Stat 





06/12/19 23:40


Sodium Chloride 0.9% [Normal Saline] 1,000 ml IV ONETIME 





06/12/19 23:41


Blood Culture x2 Reflex Set [OM.PC] Stat 





06/12/19 23:42


CULTURE BLOOD [BC] Stat 


CULTURE BLOOD [BC] Stat 





06/13/19 00:33


GASTRO OCCULT BLOOD,POC [POC] Stat 











Plan: 





Will admit patient acutely. Dr. Romero will see in AM and likely pass to Dr. Cortes. Will continue maintenance fluids. She had one episode of emesis on 

admission, but none for several hours, so we will hold NG tube for now. She 

will be NPO. There is no evidence of high grade obstruction noted. Repeat labs 

tomorrow AM. There is no obvious infectious process noted in lungs or abdomen 

and she is afebrile so we will hold antibiotics. Pt. is a code 2 DNR/DNI.

## 2019-06-14 LAB
ANION GAP SERPL CALC-SCNC: 15 MMOL/L (ref 10–20)
CHLORIDE SERPL-SCNC: 112 MMOL/L (ref 98–107)
SODIUM SERPL-SCNC: 147 MMOL/L (ref 136–145)

## 2019-06-14 RX ADMIN — ZINC SCH TAB: TAB ORAL at 07:59

## 2019-06-14 RX ADMIN — BUDESONIDE SCH MG: 0.5 SUSPENSION RESPIRATORY (INHALATION) at 06:08

## 2019-06-14 RX ADMIN — MICONAZOLE NITRATE SCH APPLICFUL: 20 CREAM VAGINAL at 07:59

## 2019-06-14 NOTE — DISCH
PRIMARY DISCHARGE DIAGNOSES:

1. Vomiting with gastroparesis known history, but concern for hematemesis.

    However, occult testing were negative and hemoglobin remained stable.

2. Profound mental retardation.

3. History of asthma, stable without exacerbation.

4. Questionable esophagitis by CT.

5. Cerebral palsy.

6. History of seizures with no recent seizures.

7. Mild hypernatremia probably due to poor oral intake.

8. Concern for aspiration, but no sign of pneumonia.  The patient would not be

    able to participate in a speech therapy program given her intellectual

    delays.

 

REASON FOR ADMISSION:  On the date of admission, this 73-year-old female was

sent over from [facility] due to possibly vomiting up some blood.  She

does vomit and cough quite a bit at baseline and I did discuss this with Dr. Cortes, her primary care.  She otherwise did not appear to be in any severe pain.

She had a CT of the abdomen, which did not show any acute source, but did

question some esophagitis changes.  She was given IV Reglan.  She had no further

vomiting.  She had a sliding hiatal hernia also noted on her CT.  She was taking

in some clear liquids.  She was alert and awake, but there was concern she might

have trouble swallowing.  She was not hypoxic and not requiring any oxygen.  Her

blood pressures were not low.  She had some mild tachycardia, but that improved

by discharge.  She had not had a bowel movement during her stay, but her stay

was less than 48 hours.  She was receiving IV fluids until discharge.

 

DISCHARGING LABS:  Include her white count normal at 10, it was slightly

elevated at 11.5 on admission; hemoglobin 12.8, down from 14.6; platelets 260.

Sodium 147, potassium 4, chloride 112, bicarb 24, BUN 17, creatinine 0.5,

glucose 112, calcium 8.4.  Alkaline phosphatase was mildly elevated on admission

at 173, but not rechecked.  The patient was given some IV Protonix.  She was

continued on her oral Pepcid.  During her stay, she was getting her regular

nebulizers.  She also had some rash and itchy area in her nabil area, so was

given some miconazole vaginal cream.  The patient did not receive DVT

prophylaxis, but again was admitted less than 48 hours.

 

DISCHARGE PLANS AND INSTRUCTIONS:  She is going back to [facility].

No further lab monitoring was ordered.  She had actually not had labs in like 4

years through the clinic.  Did discuss with Dr. Cortes, who agrees that palliative

care or hospice would be indicated for her.  She should resume her same Reglan

at [facility].  She should continue the miconazole cream in the nabil

area.  Can consider enema when she returns to the [facility], but I do not feel

like having a bowel movement here is mandatory to her return back as she does

seem to be basically at baseline.

 

PHYSICAL EXAMINATION:

Vital Signs:  Discharge vitals include temperature 98.7, pulse 94, blood

pressure 155/83, respiratory rate 20, O2 of 94% on room air.

General:  She is in no acute distress.  She is resting in bed.  She does move

her hands around in sort of a flapping pattern, but did not appear to be

specifically pushing me away.

Heart:  When I did her heart exam, it was regular rate and rhythm.  S1, S2

without murmur.

Lungs:  Lung sounds did show some slight expiratory wheezing more centrally, but

in the bases and peripherally, there was no wheezing and did have normal

respiratory sounds, but limited effort.

Abdomen:  Distended, but she had positive active bowel sounds and was nontender.

Mental Status:  She is completely disoriented, but her eyes are open and she is

alert, but she is nonverbal.

Extremities:  Did have shorter legs with booty supports in place on both feet.

No edema.

 

 

MKA:  06/14/2019 12:07:04  MODL:  06/14/2019 17:59:44

Job #:  [id number]